# Patient Record
Sex: FEMALE | URBAN - METROPOLITAN AREA
[De-identification: names, ages, dates, MRNs, and addresses within clinical notes are randomized per-mention and may not be internally consistent; named-entity substitution may affect disease eponyms.]

---

## 2023-02-09 ENCOUNTER — OFFICE VISIT (OUTPATIENT)
Facility: LOCATION | Age: 73
End: 2023-02-09
Payer: MEDICARE

## 2023-02-09 DIAGNOSIS — H16.223 KERATOCONJUNCTIVITIS SICCA, BILATERAL: Primary | ICD-10-CM

## 2023-02-09 DIAGNOSIS — Z96.1 PRESENCE OF INTRAOCULAR LENS: ICD-10-CM

## 2023-02-09 DIAGNOSIS — H04.123 TEAR FILM INSUFFICIENCY OF BILATERAL LACRIMAL GLANDS: ICD-10-CM

## 2023-02-09 DIAGNOSIS — H16.143 PUNCTATE KERATITIS, BILATERAL: ICD-10-CM

## 2023-02-09 PROCEDURE — 99203 OFFICE O/P NEW LOW 30 MIN: CPT

## 2023-02-09 RX ORDER — LOTEPREDNOL ETABONATE 5 MG/G
0.5 % GEL OPHTHALMIC
Qty: 5 | Refills: 0 | Status: ACTIVE
Start: 2023-02-09

## 2023-02-09 ASSESSMENT — INTRAOCULAR PRESSURE
OS: 15
OD: 14

## 2023-02-09 ASSESSMENT — KERATOMETRY
OD: 45.00
OS: 45.25

## 2023-02-09 NOTE — IMPRESSION/PLAN
Impression: Keratoconjunctivitis sicca, bilateral: V11.869. May need punctal plugs or xiidra Plan: Pt educated on the chronic nature of condition. ATs not sufficient for nature of condition. Started pt on steroid drop. Also recommended artificial tears at least QID OU. Instructed patient to notify clinic if no improvement in symptoms. Rx: loteprednol BID x4 weeks RTC: 3-4 weeks for dry eye check or sooner; prn

## 2023-03-09 ENCOUNTER — OFFICE VISIT (OUTPATIENT)
Facility: LOCATION | Age: 73
End: 2023-03-09
Payer: MEDICARE

## 2023-03-09 DIAGNOSIS — H16.223 KERATOCONJUNCTIVITIS SICCA, BILATERAL: Primary | ICD-10-CM

## 2023-03-09 PROCEDURE — 99213 OFFICE O/P EST LOW 20 MIN: CPT

## 2023-03-09 RX ORDER — LIFITEGRAST 50 MG/ML
5 % SOLUTION/ DROPS OPHTHALMIC
Qty: 180 | Refills: 4 | Status: ACTIVE
Start: 2023-03-09

## 2023-03-09 ASSESSMENT — INTRAOCULAR PRESSURE
OD: 14
OS: 14

## 2023-03-09 NOTE — IMPRESSION/PLAN
Impression: Keratoconjunctivitis sicca, bilateral: E67.175. Plan: Improvement with signs but little symptomatic improvement. Will start pt on xiidra BID OU. Pt to notify clinic if symptoms worsen or do not improve.  RTC 2-3 months for f/u

## 2023-05-22 ENCOUNTER — OFFICE VISIT (OUTPATIENT)
Facility: LOCATION | Age: 73
End: 2023-05-22
Payer: MEDICARE

## 2023-05-22 DIAGNOSIS — Z96.1 PRESENCE OF INTRAOCULAR LENS: ICD-10-CM

## 2023-05-22 DIAGNOSIS — H16.143 PUNCTATE KERATITIS, BILATERAL: ICD-10-CM

## 2023-05-22 DIAGNOSIS — H16.223 KERATOCONJUNCTIVITIS SICCA, BILATERAL: Primary | ICD-10-CM

## 2023-05-22 DIAGNOSIS — H04.123 TEAR FILM INSUFFICIENCY OF BILATERAL LACRIMAL GLANDS: ICD-10-CM

## 2023-05-22 PROCEDURE — 99213 OFFICE O/P EST LOW 20 MIN: CPT

## 2023-05-22 ASSESSMENT — VISUAL ACUITY
OS: 20/30
OD: 20/20

## 2023-05-22 ASSESSMENT — KERATOMETRY
OS: 45.13
OD: 44.50

## 2023-05-22 ASSESSMENT — INTRAOCULAR PRESSURE
OS: 9
OD: 9

## 2023-05-22 NOTE — IMPRESSION/PLAN
Impression: Keratoconjunctivitis sicca, bilateral: O43.749. failed on ATs
mild improvement with loteprednol
mild improvement with xiidra Plan: Mild improvement. Recommended pt continue use of the xiidra. Recommended pt switch from a standard AT to a gel ATs. RTC 6 months for f/u or sooner; prn Pt has a mild incomplete blink but not significant to warrant sx at this time. If pt still having significant issues will refer pt to corneal specialist for further evaluation and treatment.